# Patient Record
Sex: MALE | Race: WHITE | Employment: FULL TIME | URBAN - METROPOLITAN AREA
[De-identification: names, ages, dates, MRNs, and addresses within clinical notes are randomized per-mention and may not be internally consistent; named-entity substitution may affect disease eponyms.]

---

## 2020-10-05 ENCOUNTER — OFFICE VISIT (OUTPATIENT)
Dept: PULMONOLOGY | Age: 40
End: 2020-10-05
Payer: COMMERCIAL

## 2020-10-05 VITALS
OXYGEN SATURATION: 97 % | SYSTOLIC BLOOD PRESSURE: 118 MMHG | HEART RATE: 75 BPM | BODY MASS INDEX: 34.91 KG/M2 | WEIGHT: 272 LBS | HEIGHT: 74 IN | DIASTOLIC BLOOD PRESSURE: 80 MMHG

## 2020-10-05 PROCEDURE — G8484 FLU IMMUNIZE NO ADMIN: HCPCS | Performed by: INTERNAL MEDICINE

## 2020-10-05 PROCEDURE — 1036F TOBACCO NON-USER: CPT | Performed by: INTERNAL MEDICINE

## 2020-10-05 PROCEDURE — G8427 DOCREV CUR MEDS BY ELIG CLIN: HCPCS | Performed by: INTERNAL MEDICINE

## 2020-10-05 PROCEDURE — 99203 OFFICE O/P NEW LOW 30 MIN: CPT | Performed by: INTERNAL MEDICINE

## 2020-10-05 PROCEDURE — G8417 CALC BMI ABV UP PARAM F/U: HCPCS | Performed by: INTERNAL MEDICINE

## 2020-10-05 RX ORDER — PROPRANOLOL HYDROCHLORIDE 20 MG/1
TABLET ORAL
COMMUNITY
Start: 2020-07-13

## 2020-10-05 RX ORDER — ARIPIPRAZOLE 5 MG/1
TABLET ORAL NIGHTLY
COMMUNITY
Start: 2020-05-15 | End: 2021-05-15

## 2020-10-05 RX ORDER — MELATONIN
COMMUNITY
Start: 2020-09-08

## 2020-10-05 RX ORDER — LITHIUM CARBONATE 300 MG/1
TABLET, FILM COATED, EXTENDED RELEASE ORAL
COMMUNITY
Start: 2020-09-17 | End: 2021-01-25 | Stop reason: SINTOL

## 2020-10-05 RX ORDER — AMLODIPINE BESYLATE 5 MG/1
10 TABLET ORAL
COMMUNITY
Start: 2020-08-17

## 2020-10-05 ASSESSMENT — SLEEP AND FATIGUE QUESTIONNAIRES
HOW LIKELY ARE YOU TO NOD OFF OR FALL ASLEEP WHILE SITTING AND READING: 1
HOW LIKELY ARE YOU TO NOD OFF OR FALL ASLEEP WHILE LYING DOWN TO REST IN THE AFTERNOON WHEN CIRCUMSTANCES PERMIT: 3
HOW LIKELY ARE YOU TO NOD OFF OR FALL ASLEEP WHILE SITTING AND TALKING TO SOMEONE: 0
HOW LIKELY ARE YOU TO NOD OFF OR FALL ASLEEP WHILE SITTING QUIETLY AFTER LUNCH WITHOUT ALCOHOL: 0
HOW LIKELY ARE YOU TO NOD OFF OR FALL ASLEEP WHEN YOU ARE A PASSENGER IN A CAR FOR AN HOUR WITHOUT A BREAK: 2
ESS TOTAL SCORE: 7
HOW LIKELY ARE YOU TO NOD OFF OR FALL ASLEEP WHILE WATCHING TV: 1
HOW LIKELY ARE YOU TO NOD OFF OR FALL ASLEEP IN A CAR, WHILE STOPPED FOR A FEW MINUTES IN TRAFFIC: 0
HOW LIKELY ARE YOU TO NOD OFF OR FALL ASLEEP WHILE SITTING INACTIVE IN A PUBLIC PLACE: 0

## 2020-10-05 NOTE — PROGRESS NOTES
Subjective:     Ethan Mc is a 44 y.o. male who complains today of:     Chief Complaint   Patient presents with    Sleep Apnea     wife states he stopped breathing and snores. Does not wake up feeling rested       HPI  Patient presents for obstructive sleep apnea    Patient presents for evaluation of MARTIN, he has snoring, witnessed apnea reported by his wife, he feels tired during the day, and sleepy but does not fall asleep frequently during the day, no lower extremity edema, no chest pain, no shortness of breath he does have seasonal allergies, otherwise no asthma weight is stable and he is trying to lose some weight. He thinks her dad had sleep apnea however he was never tested. He works as a  no car accident and no sleeping while driving      Sleep Medicine 10/5/2020   Sitting and reading 1   Watching TV 1   Sitting, inactive in a public place (e.g. a theatre or a meeting) 0   As a passenger in a car for an hour without a break 2   Lying down to rest in the afternoon when circumstances permit 3   Sitting and talking to someone 0   Sitting quietly after a lunch without alcohol 0   In a car, while stopped for a few minutes in traffic 0   Total score 7           Allergies:  Patient has no known allergies.   Past Medical History:   Diagnosis Date    Allergic rhinitis     Bipolar disorder ([facility] [address]     Depression      Past Surgical History:   Procedure Laterality Date    TONSILLECTOMY AND ADENOIDECTOMY       Family History   Problem Relation Age of Onset    Mental Illness Mother     Heart Disease Father     Diabetes Father     Kidney Disease Father     Mental Illness Father      Social History     Socioeconomic History    Marital status:      Spouse name: Not on file    Number of children: Not on file    Years of education: Not on file    Highest education level: Not on file   Occupational History    Not on file   Social Needs    Financial resource strain: Not on file   [address] insecurity     Worry: Not on file     Inability: Not on file    Transportation needs     Medical: Not on file     Non-medical: Not on file   Tobacco Use    Smoking status: Never Smoker    Smokeless tobacco: Never Used   Substance and Sexual Activity    Alcohol use: Yes     Comment: socially.  Drug use: Never    Sexual activity: Not on file   Lifestyle    Physical activity     Days per week: Not on file     Minutes per session: Not on file    Stress: Not on file   Relationships    Social connections     Talks on phone: Not on file     Gets together: Not on file     Attends [employer] service: Not on file     Active member of club or organization: Not on file     Attends meetings of clubs or organizations: Not on file     Relationship status: Not on file    Intimate partner violence     Fear of current or ex partner: Not on file     Emotionally abused: Not on file     Physically abused: Not on file     Forced sexual activity: Not on file   Other Topics Concern    Not on file   Social History Narrative    Not on file         Review of Systems      ROS: 10 organs review of system is done including general, psychological, ENT, hematological, endocrine, respiratory, cardiovascular, gastrointestinal,musculoskeletal, neurological,  allergy and Immunology is done and is otherwise negative. Current Outpatient Medications   Medication Sig Dispense Refill    lithium (LITHOBID) 300 MG extended release tablet       amLODIPine (NORVASC) 5 MG tablet       ARIPiprazole (ABILIFY) 5 MG tablet Take by mouth nightly      vitamin D3 (CHOLECALCIFEROL) 25 MCG (1000 UT) TABS tablet       propranolol (INDERAL) 20 MG tablet       lamoTRIgine (LAMICTAL) 150 MG tablet Take 150 mg by mouth daily.  venlafaxine (EFFEXOR XR) 75 MG XR capsule Take 75 mg by mouth 3 times daily.  montelukast (SINGULAIR) 10 MG tablet Take 1 tablet by mouth nightly.  (Patient not taking: Reported on 10/5/2020) 30 tablet 11     No current facility-administered medications for this visit. Objective:     Vitals:    10/05/20 1312   BP: 118/80   Site: Right Lower Arm   Position: Sitting   Cuff Size: Medium Adult   Pulse: 75   SpO2: 97%   Weight: 272 lb (123.4 kg)   Height: 6' 2\" (1.88 m)         Physical Exam  Constitutional:       Appearance: He is well-developed. HENT:      Head: Normocephalic and atraumatic. Eyes:      General:         Left eye: No discharge. Conjunctiva/sclera: Conjunctivae normal.      Pupils: Pupils are equal, round, and reactive to light. Neck:      Musculoskeletal: Normal range of motion and neck supple. Vascular: No JVD. Cardiovascular:      Rate and Rhythm: Normal rate and regular rhythm. Heart sounds: Normal heart sounds. No murmur. No friction rub. No gallop. Pulmonary:      Effort: Pulmonary effort is normal. No respiratory distress. Breath sounds: Normal breath sounds. No wheezing or rales. Chest:      Chest wall: No tenderness. Abdominal:      General: Bowel sounds are normal.      Palpations: Abdomen is soft. Musculoskeletal:         General: No tenderness or deformity. Right lower leg: No edema. Left lower leg: No edema. Skin:     General: Skin is warm and dry. Neurological:      Mental Status: He is alert and oriented to person, place, and time. Psychiatric:         Judgment: Judgment normal.          Lab results reviewed in [facility] chart       Assessment and Plan       Diagnosis Orders   1. Sleep apnea, unspecified type  Sleep Study with PAP Titration   2. Class 1 obesity due to excess calories without serious comorbidity with body mass index (BMI) of 34.0 to 34.9 in adult     3.  Non-seasonal allergic rhinitis, unspecified trigger       · Sleep study  · Lose weight  · Can use over-the-counter as needed Flonase  · Follow-up with me after sleep study      Orders Placed This Encounter   Procedures    Sleep Study with PAP Titration     Standing Status:   Future Standing Expiration Date:   4/5/2022     Order Specific Question:   Sleep Study Titration Type     Answer:   Split Night Study (Baseline followed by PAP Titration)     Order Specific Question:   Location For Sleep Study     Answer:   [place]     Order Specific Question:   Select Sleep Lab Location     Answer:   [facility]     No orders of the defined types were placed in this encounter. Discussed with patient the importance of exercise and weight control and  overall health and well-being. Reviewed with the patient: current clinical status, medications, activities and diet. Side effects, adverse effects of the medication prescribed today, as well as treatment plan and result expectations have been discussed with the patient who expresses understanding and desires to proceed. Return in about 10 weeks (around 12/14/2020).       Asuncion Hutson MD

## 2020-10-09 ENCOUNTER — HOSPITAL ENCOUNTER (OUTPATIENT)
Dept: SLEEP CENTER | Age: 40
Discharge: HOME | End: 2020-10-11
Payer: COMMERCIAL

## 2020-10-09 PROCEDURE — 95810 POLYSOM 6/> YRS 4/> PARAM: CPT

## 2020-10-11 PROCEDURE — 95810 POLYSOM 6/> YRS 4/> PARAM: CPT | Performed by: INTERNAL MEDICINE

## 2021-01-25 ENCOUNTER — OFFICE VISIT (OUTPATIENT)
Dept: PULMONOLOGY | Age: 41
End: 2021-01-25
Payer: COMMERCIAL

## 2021-01-25 VITALS
WEIGHT: 269.6 LBS | RESPIRATION RATE: 16 BRPM | DIASTOLIC BLOOD PRESSURE: 82 MMHG | HEART RATE: 76 BPM | HEIGHT: 74 IN | SYSTOLIC BLOOD PRESSURE: 124 MMHG | TEMPERATURE: 96.8 F | BODY MASS INDEX: 34.6 KG/M2 | OXYGEN SATURATION: 98 %

## 2021-01-25 DIAGNOSIS — J30.89 NON-SEASONAL ALLERGIC RHINITIS, UNSPECIFIED TRIGGER: ICD-10-CM

## 2021-01-25 DIAGNOSIS — E66.09 CLASS 1 OBESITY DUE TO EXCESS CALORIES WITHOUT SERIOUS COMORBIDITY WITH BODY MASS INDEX (BMI) OF 34.0 TO 34.9 IN ADULT: ICD-10-CM

## 2021-01-25 DIAGNOSIS — G47.33 OSA (OBSTRUCTIVE SLEEP APNEA): Primary | ICD-10-CM

## 2021-01-25 PROCEDURE — 1036F TOBACCO NON-USER: CPT | Performed by: INTERNAL MEDICINE

## 2021-01-25 PROCEDURE — 99213 OFFICE O/P EST LOW 20 MIN: CPT | Performed by: INTERNAL MEDICINE

## 2021-01-25 PROCEDURE — G8427 DOCREV CUR MEDS BY ELIG CLIN: HCPCS | Performed by: INTERNAL MEDICINE

## 2021-01-25 PROCEDURE — G8417 CALC BMI ABV UP PARAM F/U: HCPCS | Performed by: INTERNAL MEDICINE

## 2021-01-25 PROCEDURE — G8484 FLU IMMUNIZE NO ADMIN: HCPCS | Performed by: INTERNAL MEDICINE

## 2021-01-25 RX ORDER — CETIRIZINE HYDROCHLORIDE 10 MG/1
10 TABLET ORAL DAILY
COMMUNITY

## 2021-01-25 RX ORDER — EZETIMIBE 10 MG/1
TABLET ORAL
COMMUNITY
Start: 2021-01-11

## 2021-01-25 RX ORDER — ERGOCALCIFEROL (VITAMIN D2) 1250 MCG
CAPSULE ORAL
COMMUNITY
Start: 2020-05-15 | End: 2021-05-15

## 2021-01-25 RX ORDER — FLUOXETINE HYDROCHLORIDE 20 MG/1
CAPSULE ORAL
COMMUNITY
Start: 2020-12-15

## 2021-01-25 NOTE — PROGRESS NOTES
Subjective:     Vinh Kang is a 36 y.o. male who complains today of:     Chief Complaint   Patient presents with    Follow-up     10 Week F/U for MARTIN    Results     Sleep Study       HPI  Patient presents for MARTIN follow-up    Patient had sleep study done showing severe obstructive sleep apnea, AHI 41, he was unable to do the titration study could not tolerate a full facemask, he believes he cannot tolerate the nasal mask however this was not an option due to current pandemic. Otherwise he is doing good, no chest pain, no coughing, he does have nasal congestion he has Flonase to use as needed, weight is stable, no lower extremity edema, no heartburn, no fever no chills. Allergies:  Patient has no known allergies. Past Medical History:   Diagnosis Date    Allergic rhinitis     Bipolar disorder ([facility] [address]     Depression     Sleep apnea      Past Surgical History:   Procedure Laterality Date    TONSILLECTOMY AND ADENOIDECTOMY       Family History   Problem Relation Age of Onset    Mental Illness Mother     Heart Disease Father     Diabetes Father     Kidney Disease Father     Mental Illness Father      Social History     Socioeconomic History    Marital status:      Spouse name: Not on file    Number of children: Not on file    Years of education: Not on file    Highest education level: Not on file   Occupational History    Not on file   Social Needs    Financial resource strain: Not on file    Food insecurity     Worry: Not on file     Inability: Not on file    Transportation needs     Medical: Not on file     Non-medical: Not on file   Tobacco Use    Smoking status: Never Smoker    Smokeless tobacco: Never Used   Substance and Sexual Activity    Alcohol use: Yes     Comment: socially.     Drug use: Never    Sexual activity: Not on file   Lifestyle    Physical activity     Days per week: Not on file     Minutes per session: Not on file    Stress: Not on file Relationships    Social connections     Talks on phone: Not on file     Gets together: Not on file     Attends Evangelical service: Not on file     Active member of club or organization: Not on file     Attends meetings of clubs or organizations: Not on file     Relationship status: Not on file    Intimate partner violence     Fear of current or ex partner: Not on file     Emotionally abused: Not on file     Physically abused: Not on file     Forced sexual activity: Not on file   Other Topics Concern    Not on file   Social History Narrative    Not on file         Review of Systems      ROS: 10 organs review of system is done including general, psychological, ENT, hematological, endocrine, respiratory, cardiovascular, gastrointestinal,musculoskeletal, neurological,  allergy and Immunology is done and is otherwise negative. Current Outpatient Medications   Medication Sig Dispense Refill    cetirizine (ZYRTEC) 10 MG tablet Take 10 mg by mouth daily      ergocalciferol (ERGOCALCIFEROL) 1.25 MG (19484 UT) capsule Take by mouth      ezetimibe (ZETIA) 10 MG tablet       FLUoxetine (PROZAC) 20 MG capsule       amLODIPine (NORVASC) 5 MG tablet 10 mg       ARIPiprazole (ABILIFY) 5 MG tablet Take by mouth nightly      vitamin D3 (CHOLECALCIFEROL) 25 MCG (1000 UT) TABS tablet       propranolol (INDERAL) 20 MG tablet       lamoTRIgine (LAMICTAL) 150 MG tablet Take 150 mg by mouth daily.  montelukast (SINGULAIR) 10 MG tablet Take 1 tablet by mouth nightly. 30 tablet 11    venlafaxine (EFFEXOR XR) 75 MG XR capsule Take 75 mg by mouth 3 times daily. No current facility-administered medications for this visit.         Objective:     Vitals:    01/25/21 1429   BP: 124/82   Site: Right Upper Arm   Position: Sitting   Cuff Size: Large Adult   Pulse: 76   Resp: 16   Temp: 96.8 °F (36 °C)   TempSrc: Tympanic   SpO2: 98%   Weight: 269 lb 9.6 oz (122.3 kg)   Height: 6' 2\" (1.88 m)         Physical Exam  Constitutional:       Appearance: He is well-developed. HENT:      Head: Normocephalic and atraumatic. Eyes:      General:         Left eye: No discharge. Conjunctiva/sclera: Conjunctivae normal.      Pupils: Pupils are equal, round, and reactive to light. Neck:      Musculoskeletal: Normal range of motion and neck supple. Vascular: No JVD. Cardiovascular:      Rate and Rhythm: Normal rate and regular rhythm. Heart sounds: Normal heart sounds. No murmur. No friction rub. No gallop. Pulmonary:      Effort: Pulmonary effort is normal. No respiratory distress. Breath sounds: Normal breath sounds. No wheezing or rales. Chest:      Chest wall: No tenderness. Abdominal:      General: Bowel sounds are normal.      Palpations: Abdomen is soft. Musculoskeletal:         General: No tenderness or deformity. Right lower leg: No edema. Left lower leg: No edema. Skin:     General: Skin is warm and dry. Neurological:      Mental Status: He is alert and oriented to person, place, and time. Psychiatric:         Judgment: Judgment normal.            Lab results reviewed in chart  Severe obstructive sleep apnea, desaturation for 15 minutes,    Assessment and Plan       Diagnosis Orders   1. MARTIN (obstructive sleep apnea)  DME Order for CPAP as OP   2. Class 1 obesity due to excess calories without serious comorbidity with body mass index (BMI) of 34.0 to 34.9 in adult     3. Non-seasonal allergic rhinitis, unspecified trigger       · MARTIN: Severe with nocturnal desaturation, patient was unable to do the titration study, will order auto CPAP, will review compliance and response to treatment on follow-up, after he starts CPAP and whenever patient is compliant with treatment we will repeat nocturnal pulse oximetry to make sure no further desaturation. Discussed with the patient importance of treating sleep apnea, risk of stroke, cardiac complication and pulmonary complications. He is understanding and he is very committed to start treatment. · Patient has nasal congestion, continue using Flonase nightly before applying the CPAP machine. · Weight loss is recommended. Orders Placed This Encounter   Procedures    DME Order for CPAP as OP     CPAP Auto Adjust 5 - 20 cm H2O    Heated Humidifier    Heated Tubing with Integrated Element 1 per 3 months    Nasal Mask 1 per 3 months and Cushions/Seals 2 per month    Headgear 1 per 6 months, Chin Strap 1 per 6 months, Disposable Filters 2 per month, Non-disposable Filters 1 per 6 months, Chambers 1 per 6 months and Other Related Supplies. Replace supplies and accessories as needed. Patient may choose another interface for compliance and comfort. Comments: Tubing , head gears , filters  Diagnosis: Sever MARTIN   Length of need: Lifetime     No orders of the defined types were placed in this encounter. Discussed with patient the importance of exercise and weight control and  overall health and well-being. Reviewed with the patient: current clinical status, medications, activities and diet. Side effects, adverse effects of the medication prescribed today, as well as treatment plan and result expectations have been discussed with the patient who expresses understanding and desires to proceed. Return in about 3 months (around 4/25/2021).       Violeta Mcelroy MD

## 2023-10-22 ENCOUNTER — OFFICE VISIT (OUTPATIENT)
Dept: PRIMARY CARE CLINIC | Age: 43
End: 2023-10-22

## 2023-10-22 VITALS
RESPIRATION RATE: 20 BRPM | WEIGHT: 307 LBS | HEIGHT: 74 IN | TEMPERATURE: 97.3 F | BODY MASS INDEX: 39.4 KG/M2 | SYSTOLIC BLOOD PRESSURE: 128 MMHG | DIASTOLIC BLOOD PRESSURE: 84 MMHG | OXYGEN SATURATION: 94 % | HEART RATE: 82 BPM

## 2023-10-22 DIAGNOSIS — J06.9 UPPER RESPIRATORY TRACT INFECTION, UNSPECIFIED TYPE: Primary | ICD-10-CM

## 2023-10-22 RX ORDER — ARIPIPRAZOLE 5 MG/1
TABLET ORAL
COMMUNITY
Start: 2023-08-25

## 2023-10-22 RX ORDER — PREDNISONE 20 MG/1
20 TABLET ORAL 2 TIMES DAILY
Qty: 10 TABLET | Refills: 0 | Status: SHIPPED | OUTPATIENT
Start: 2023-10-22 | End: 2023-10-27

## 2023-10-22 RX ORDER — DOXYCYCLINE HYCLATE 100 MG
100 TABLET ORAL 2 TIMES DAILY
Qty: 14 TABLET | Refills: 0 | Status: SHIPPED | OUTPATIENT
Start: 2023-10-22 | End: 2023-10-29

## 2023-10-22 ASSESSMENT — ENCOUNTER SYMPTOMS
WHEEZING: 1
GASTROINTESTINAL NEGATIVE: 1
RHINORRHEA: 0
SINUS PAIN: 0
SORE THROAT: 1
EYES NEGATIVE: 1
ALLERGIC/IMMUNOLOGIC NEGATIVE: 1
VOMITING: 0
SHORTNESS OF BREATH: 1
COUGH: 1
SINUS PRESSURE: 0
ABDOMINAL PAIN: 0
DIARRHEA: 0
NAUSEA: 0

## 2023-10-22 ASSESSMENT — PATIENT HEALTH QUESTIONNAIRE - PHQ9
SUM OF ALL RESPONSES TO PHQ QUESTIONS 1-9: 0
SUM OF ALL RESPONSES TO PHQ QUESTIONS 1-9: 0
SUM OF ALL RESPONSES TO PHQ9 QUESTIONS 1 & 2: 0
1. LITTLE INTEREST OR PLEASURE IN DOING THINGS: 0
SUM OF ALL RESPONSES TO PHQ QUESTIONS 1-9: 0
2. FEELING DOWN, DEPRESSED OR HOPELESS: 0
SUM OF ALL RESPONSES TO PHQ QUESTIONS 1-9: 0

## 2023-11-20 ENCOUNTER — OFFICE VISIT (OUTPATIENT)
Dept: FAMILY MEDICINE CLINIC | Age: 43
End: 2023-11-20
Payer: COMMERCIAL

## 2023-11-20 VITALS
WEIGHT: 307.1 LBS | DIASTOLIC BLOOD PRESSURE: 86 MMHG | RESPIRATION RATE: 13 BRPM | SYSTOLIC BLOOD PRESSURE: 118 MMHG | BODY MASS INDEX: 39.41 KG/M2 | OXYGEN SATURATION: 94 % | TEMPERATURE: 97 F | HEIGHT: 74 IN | HEART RATE: 74 BPM

## 2023-11-20 DIAGNOSIS — J40 BRONCHITIS: Primary | ICD-10-CM

## 2023-11-20 DIAGNOSIS — R05.1 ACUTE COUGH: ICD-10-CM

## 2023-11-20 LAB
Lab: NORMAL
PERFORMING INSTRUMENT: NORMAL
QC PASS/FAIL: NORMAL
SARS-COV-2, POC: NORMAL

## 2023-11-20 PROCEDURE — 87426 SARSCOV CORONAVIRUS AG IA: CPT | Performed by: NURSE PRACTITIONER

## 2023-11-20 PROCEDURE — 99213 OFFICE O/P EST LOW 20 MIN: CPT | Performed by: NURSE PRACTITIONER

## 2023-11-20 PROCEDURE — G8417 CALC BMI ABV UP PARAM F/U: HCPCS | Performed by: NURSE PRACTITIONER

## 2023-11-20 PROCEDURE — G8484 FLU IMMUNIZE NO ADMIN: HCPCS | Performed by: NURSE PRACTITIONER

## 2023-11-20 PROCEDURE — G8427 DOCREV CUR MEDS BY ELIG CLIN: HCPCS | Performed by: NURSE PRACTITIONER

## 2023-11-20 PROCEDURE — 1036F TOBACCO NON-USER: CPT | Performed by: NURSE PRACTITIONER

## 2023-11-20 RX ORDER — AZITHROMYCIN 250 MG/1
TABLET, FILM COATED ORAL
Qty: 6 TABLET | Refills: 0 | Status: SHIPPED | OUTPATIENT
Start: 2023-11-20

## 2023-11-20 RX ORDER — BROMPHENIRAMINE MALEATE, PSEUDOEPHEDRINE HYDROCHLORIDE, AND DEXTROMETHORPHAN HYDROBROMIDE 2; 30; 10 MG/5ML; MG/5ML; MG/5ML
5 SYRUP ORAL 4 TIMES DAILY PRN
Qty: 118 ML | Refills: 0 | Status: SHIPPED | OUTPATIENT
Start: 2023-11-20

## 2023-11-20 RX ORDER — BENZONATATE 200 MG/1
200 CAPSULE ORAL 3 TIMES DAILY PRN
Qty: 30 CAPSULE | Refills: 0 | Status: SHIPPED | OUTPATIENT
Start: 2023-11-20 | End: 2023-11-30

## 2023-11-20 SDOH — ECONOMIC STABILITY: INCOME INSECURITY: HOW HARD IS IT FOR YOU TO PAY FOR THE VERY BASICS LIKE FOOD, HOUSING, MEDICAL CARE, AND HEATING?: NOT VERY HARD

## 2023-11-20 SDOH — ECONOMIC STABILITY: HOUSING INSECURITY
IN THE LAST 12 MONTHS, WAS THERE A TIME WHEN YOU DID NOT HAVE A STEADY PLACE TO SLEEP OR SLEPT IN A SHELTER (INCLUDING NOW)?: NO

## 2023-11-20 SDOH — ECONOMIC STABILITY: FOOD INSECURITY: WITHIN THE PAST 12 MONTHS, YOU WORRIED THAT YOUR FOOD WOULD RUN OUT BEFORE YOU GOT MONEY TO BUY MORE.: NEVER TRUE

## 2023-11-20 SDOH — ECONOMIC STABILITY: FOOD INSECURITY: WITHIN THE PAST 12 MONTHS, THE FOOD YOU BOUGHT JUST DIDN'T LAST AND YOU DIDN'T HAVE MONEY TO GET MORE.: NEVER TRUE

## 2023-11-20 ASSESSMENT — ENCOUNTER SYMPTOMS
SHORTNESS OF BREATH: 1
DIARRHEA: 0
SINUS PAIN: 0
NAUSEA: 0
RHINORRHEA: 0
SINUS PRESSURE: 0
ABDOMINAL PAIN: 1
CHEST TIGHTNESS: 0
COUGH: 1

## 2024-11-08 ENCOUNTER — OFFICE VISIT (OUTPATIENT)
Dept: URGENT CARE | Age: 44
End: 2024-11-08
Payer: COMMERCIAL

## 2024-11-08 VITALS
DIASTOLIC BLOOD PRESSURE: 85 MMHG | HEART RATE: 104 BPM | OXYGEN SATURATION: 96 % | SYSTOLIC BLOOD PRESSURE: 123 MMHG | BODY MASS INDEX: 37.86 KG/M2 | RESPIRATION RATE: 20 BRPM | HEIGHT: 74 IN | TEMPERATURE: 98 F | WEIGHT: 295 LBS

## 2024-11-08 DIAGNOSIS — M54.41 ACUTE RIGHT-SIDED LOW BACK PAIN WITH RIGHT-SIDED SCIATICA: Primary | ICD-10-CM

## 2024-11-08 RX ORDER — LIDOCAINE 4 G/100G
PATCH TOPICAL
COMMUNITY
Start: 2024-06-05

## 2024-11-08 RX ORDER — KETOROLAC TROMETHAMINE 10 MG/1
10 TABLET, FILM COATED ORAL EVERY 6 HOURS PRN
Qty: 20 TABLET | Refills: 0 | Status: SHIPPED | OUTPATIENT
Start: 2024-11-08 | End: 2024-11-08

## 2024-11-08 RX ORDER — EZETIMIBE 10 MG/1
1 TABLET ORAL DAILY
COMMUNITY
Start: 2021-01-11 | End: 2025-02-12

## 2024-11-08 RX ORDER — ALBUTEROL SULFATE 90 UG/1
INHALANT RESPIRATORY (INHALATION)
COMMUNITY
Start: 2024-02-12

## 2024-11-08 RX ORDER — PROPRANOLOL HYDROCHLORIDE 20 MG/1
1 TABLET ORAL 2 TIMES DAILY PRN
COMMUNITY
Start: 2024-04-23 | End: 2025-04-24

## 2024-11-08 RX ORDER — CHOLECALCIFEROL (VITAMIN D3) 25 MCG
1 TABLET ORAL DAILY
COMMUNITY
Start: 2024-02-12

## 2024-11-08 RX ORDER — KETOROLAC TROMETHAMINE 10 MG/1
10 TABLET, FILM COATED ORAL EVERY 6 HOURS PRN
Qty: 20 TABLET | Refills: 0 | Status: SHIPPED | OUTPATIENT
Start: 2024-11-08 | End: 2024-11-13

## 2024-11-08 RX ORDER — METHYLPREDNISOLONE 4 MG/1
TABLET ORAL
Qty: 21 TABLET | Refills: 0 | Status: SHIPPED | OUTPATIENT
Start: 2024-11-08 | End: 2024-11-08

## 2024-11-08 RX ORDER — CYCLOBENZAPRINE HCL 10 MG
TABLET ORAL
COMMUNITY

## 2024-11-08 RX ORDER — KETOROLAC TROMETHAMINE 30 MG/ML
30 INJECTION, SOLUTION INTRAMUSCULAR; INTRAVENOUS ONCE
Status: SHIPPED | OUTPATIENT
Start: 2024-11-08 | End: 2024-11-13

## 2024-11-08 RX ORDER — ARIPIPRAZOLE 5 MG/1
0.5 TABLET ORAL DAILY
COMMUNITY
Start: 2023-08-25 | End: 2025-01-08

## 2024-11-08 RX ORDER — METHYLPREDNISOLONE 4 MG/1
TABLET ORAL
Qty: 21 TABLET | Refills: 0 | Status: SHIPPED | OUTPATIENT
Start: 2024-11-08 | End: 2024-11-15

## 2024-11-08 RX ORDER — AMLODIPINE BESYLATE 10 MG/1
1 TABLET ORAL DAILY
COMMUNITY
Start: 2024-02-12 | End: 2025-02-12

## 2024-11-08 RX ORDER — FLUOXETINE HYDROCHLORIDE 40 MG/1
40 CAPSULE ORAL
COMMUNITY

## 2024-11-08 RX ORDER — MONTELUKAST SODIUM 10 MG/1
10 TABLET ORAL
COMMUNITY

## 2024-11-08 RX ORDER — CETIRIZINE HYDROCHLORIDE 10 MG/1
1 TABLET ORAL DAILY
COMMUNITY

## 2024-11-08 ASSESSMENT — ENCOUNTER SYMPTOMS
HEMATOLOGIC/LYMPHATIC NEGATIVE: 1
NEUROLOGICAL NEGATIVE: 1
ENDOCRINE NEGATIVE: 1
CONSTITUTIONAL NEGATIVE: 1
PSYCHIATRIC NEGATIVE: 1
MUSCULOSKELETAL NEGATIVE: 1
GASTROINTESTINAL NEGATIVE: 1
RESPIRATORY NEGATIVE: 1
EYES NEGATIVE: 1
PALPITATIONS: 0
ALLERGIC/IMMUNOLOGIC NEGATIVE: 1

## 2024-11-08 ASSESSMENT — PAIN SCALES - GENERAL: PAINLEVEL_OUTOF10: 4

## 2024-11-08 NOTE — PROGRESS NOTES
Subjective   Patient ID: Bao Gaitan is a 44 y.o. male. They present today with a chief complaint of Back Problem (Was tossing something and now rt lower part of back is spazzing. ).    History of Present Illness  HPI    Pt presents to urgent care with c/o R sided low back pain s/p moving heavy bags of mulch today.  Pt states this happens to him a few times per year.  States it feels like a muscle spasm in his lower back that shoots down R buttocks into R posterior thigh.  He denies numbness, tingling, incontinence, fever, IV drug use.  Pt denies CP, SOB, palpitations, fevers, abd pain, n/v/d, sick contacts, recent travel.        Past Medical History  Allergies as of 11/08/2024 - Reviewed 11/08/2024   Allergen Reaction Noted    Atorvastatin Other and Rash 01/11/2021       (Not in a hospital admission)       Past Medical History:   Diagnosis Date    Bipolar disorder, currently in remission, most recent episode unspecified (Multi) 01/27/2021    History of depressed bipolar disorder    Personal history of other diseases of the circulatory system 01/27/2021    History of hypertension    Post-traumatic stress disorder, unspecified 01/27/2021    PTSD (post-traumatic stress disorder)       Past Surgical History:   Procedure Laterality Date    OTHER SURGICAL HISTORY  01/27/2021    Tonsillectomy with adenoidectomy        reports that he has never smoked. He has never used smokeless tobacco.    Review of Systems  Review of Systems   Constitutional: Negative.    HENT: Negative.     Eyes: Negative.    Respiratory: Negative.     Cardiovascular:  Negative for chest pain and palpitations.   Gastrointestinal: Negative.    Endocrine: Negative.    Genitourinary: Negative.    Musculoskeletal: Negative.    Skin: Negative.    Allergic/Immunologic: Negative.    Neurological: Negative.    Hematological: Negative.    Psychiatric/Behavioral: Negative.     All other systems reviewed and are negative.                            "      Objective    Vitals:    11/08/24 1312   BP: 123/85   Pulse: 104   Resp: 20   Temp: 36.7 °C (98 °F)   TempSrc: Oral   SpO2: 96%   Weight: 134 kg (295 lb)   Height: 1.88 m (6' 2\")     No LMP for male patient.    Physical Exam    Procedures    Point of Care Test & Imaging Results from this visit  No results found for this visit on 11/08/24.   No results found.    Diagnostic study results (if any) were reviewed by JOYCE Izaguirre.    Assessment/Plan   Allergies, medications, history, and pertinent labs/EKGs/Imaging reviewed by JOYCE Izaguirre.     Medical Decision Making  Urgent Care Course: 45 yo presents to the UC with low back pain.  Patient is afebrile, hemodynamically stable.  Patient denies fever, incontinence, saddle anesthesia, IV drug abuse, cp, sob, ab pain, dysuria, and diarrhea.  Low suspicion for cord compression given patient is without incontinence, saddle anesthesia, IV drug abuse.  Patient likely with musculoskeletal pain, muscle spasm. Given IM Toradol in UC as he states that usually works well for him.  States he has Rx zanaflex at home.  Patient given prescriptions for medrol dose pack, toradol.  Patient given low back pain warning signs and will f/u with pcp.        Prior external records reviewed: Outpatient records     Imaging and EKG: Interpretation by radiology and independently interpreted by me.     Independent Hx provided by: Patient     Pt’s case/impression summarized and discussed with: Patient     Likely Dx given clinical picture: Low back pain, muscle spasm     Although not an exhaustive list of Differential Diagnosis (though considered), patient’s HPI, PE, and other findings are not suggestive of: Compression fracture, sciatica, ankylosing spondylitis, muscle spasm, UTI, pyelonephritis, muscle strain     Patient at time of discharge was clinically well-appearing and HDS for outpatient management. The patient and/or family was given the opportunity to ask " questions prior to discharge, understood my verbal discussion of the plans for treatment, expected course, indications to return to  or go to ED, and the need for timely follow up as directed.   He was given excuse note for work per his request.     Condition: Stable     OARRS reviewed, unintentional overdose risk score is 380     Orders and Diagnoses  Diagnoses and all orders for this visit:  Acute right-sided low back pain with right-sided sciatica  -     ketorolac (Toradol) injection 30 mg  -     methylPREDNISolone (Medrol Dospak) 4 mg tablets; Take as directed on package.  -     ketorolac (Toradol) 10 mg tablet; Take 1 tablet (10 mg) by mouth every 6 hours if needed for moderate pain (4 - 6) for up to 5 days.      Medical Admin Record      Patient disposition: Home    Electronically signed by JOYCE Izaguirre  2:23 PM

## 2024-11-08 NOTE — LETTER
November 8, 2024     Patient: Bao Gaitan   YOB: 1980   Date of Visit: 11/8/2024       To Whom It May Concern:    Bao Gaitan was seen in my clinic on 11/8/2024 at 11:00 am. Please excuse Bao for his absence from work on this day to make the appointment.    If you have any questions or concerns, please don't hesitate to call.         Sincerely,         VIRGINIE Izaguirre-CNP        CC: No Recipients

## 2024-12-13 ENCOUNTER — OFFICE VISIT (OUTPATIENT)
Dept: URGENT CARE | Age: 44
End: 2024-12-13
Payer: COMMERCIAL

## 2024-12-13 VITALS
HEART RATE: 103 BPM | OXYGEN SATURATION: 94 % | RESPIRATION RATE: 20 BRPM | WEIGHT: 295 LBS | SYSTOLIC BLOOD PRESSURE: 145 MMHG | HEIGHT: 74 IN | DIASTOLIC BLOOD PRESSURE: 77 MMHG | TEMPERATURE: 98.4 F | BODY MASS INDEX: 37.86 KG/M2

## 2024-12-13 DIAGNOSIS — H00.012 HORDEOLUM EXTERNUM OF RIGHT LOWER EYELID: Primary | ICD-10-CM

## 2024-12-13 RX ORDER — TOBRAMYCIN AND DEXAMETHASONE 3; 1 MG/ML; MG/ML
1 SUSPENSION/ DROPS OPHTHALMIC 4 TIMES DAILY
Qty: 2.5 ML | Refills: 0 | Status: SHIPPED | OUTPATIENT
Start: 2024-12-13 | End: 2024-12-20

## 2024-12-13 ASSESSMENT — PAIN SCALES - GENERAL: PAINLEVEL_OUTOF10: 0-NO PAIN

## 2024-12-13 NOTE — LETTER
December 13, 2024     Patient: Bao Gaitan   YOB: 1980   Date of Visit: 12/13/2024       To Whom It May Concern:    Bao Gaitan was seen in my clinic on 12/13/2024 at 8:00 am. Please excuse Bao for his absence from work on this day to make the appointment.    If you have any questions or concerns, please don't hesitate to call.         Sincerely,         Homer Lopez PA-C        CC: No Recipients

## 2024-12-13 NOTE — PROGRESS NOTES
"Subjective   Patient ID: Bao Gaitan is a 44 y.o. male who presents for Eye Problem (Rt eye swelling w redness. Issue present x 2 days. Some discharge and drainage).  HPI  Patient presents for right lower eyelid irritation.  Patient reports 2 days of progressively worsening swelling and irritation of the right lower eyelid.  Pain is exacerbated by blinking.  No left eye involvement.  No preceding upper respiratory infection reported.  No other complaints.    Review of Systems    Constitutional:  See HPI   ENT: See HPI    Neurologic:  Alert and oriented X4, No numbness, No tingling.    All other systems are negative     Objective     /77   Pulse 103   Temp 36.9 °C (98.4 °F) (Oral)   Resp 20   Ht 1.88 m (6' 2\")   Wt 134 kg (295 lb)   SpO2 94%   BMI 37.88 kg/m²     Physical Exam    General:  Alert and oriented, No acute distress.    Eye:  Pupils are equal, round and reactive to light, Normal conjunctiva.    HENT:  Normocephalic, lateral aspect of right lower eyelid is edematous, erythematous; there is crusted drainage on the upper and lower eyelid; conjunctiva slightly injected; left eye and eyelids are unremarkable  Neck:  Supple    Respiratory: Respirations are non-labored   Musculoskeletal: Normal ROM and strength  Integumentary:  Warm, Dry, Intact, No pallor, No rash.    Neurologic:  Alert, Oriented, Normal sensory, Cranial Nerves II-XII are grossly intact  Psychiatric:  Cooperative, Appropriate mood & affect.    Assessment/Plan   Exam is consistent with an early hordeolum externum of the right lower eyelid.  Prescription for TobraDex.  Work note provided.  Patient's clinical presentation is otherwise unremarkable at this time. Patient is discharged with instructions to follow-up with primary care or seek emergency medical attention for worsening symptoms or any new concerns.  Problem List Items Addressed This Visit    None  Visit Diagnoses       Hordeolum externum of right lower eyelid    -  " Primary    Relevant Medications    tobramycin-dexamethasone (Tobradex) ophthalmic suspension            Final diagnoses:   [H00.012] Hordeolum externum of right lower eyelid